# Patient Record
Sex: FEMALE | Race: AMERICAN INDIAN OR ALASKA NATIVE | NOT HISPANIC OR LATINO | URBAN - METROPOLITAN AREA
[De-identification: names, ages, dates, MRNs, and addresses within clinical notes are randomized per-mention and may not be internally consistent; named-entity substitution may affect disease eponyms.]

---

## 2017-02-16 ENCOUNTER — NEW PATIENT (OUTPATIENT)
Dept: URBAN - METROPOLITAN AREA CLINIC 30 | Facility: CLINIC | Age: 82
End: 2017-02-16
Payer: MEDICARE

## 2017-02-16 DIAGNOSIS — H02.055 TRICHIASIS WITHOUT ENTROPION, LEFT LOWER LID: ICD-10-CM

## 2017-02-16 PROCEDURE — 67820 REVISE EYELASHES: CPT | Performed by: OPTOMETRIST

## 2017-02-16 PROCEDURE — 99203 OFFICE O/P NEW LOW 30 MIN: CPT | Performed by: OPTOMETRIST

## 2017-02-16 RX ORDER — NEOMYCIN SULFATE, POLYMYXIN B SULFATE AND DEXAMETHASONE 3.5; 10000; 1 MG/ML; [USP'U]/ML; MG/ML
SUSPENSION OPHTHALMIC
Qty: 5 | Refills: 0 | Status: INACTIVE
Start: 2017-02-16 | End: 2018-09-17

## 2017-02-16 ASSESSMENT — INTRAOCULAR PRESSURE
OS: 16
OD: 17

## 2017-07-31 ENCOUNTER — FOLLOW UP ESTABLISHED (OUTPATIENT)
Dept: URBAN - METROPOLITAN AREA CLINIC 64 | Facility: CLINIC | Age: 82
End: 2017-07-31
Payer: MEDICARE

## 2017-07-31 PROCEDURE — 67820 REVISE EYELASHES: CPT | Performed by: OPTOMETRIST

## 2017-07-31 PROCEDURE — 92012 INTRM OPH EXAM EST PATIENT: CPT | Performed by: OPTOMETRIST

## 2018-09-17 ENCOUNTER — FOLLOW UP ESTABLISHED (OUTPATIENT)
Dept: URBAN - METROPOLITAN AREA CLINIC 30 | Facility: CLINIC | Age: 83
End: 2018-09-17
Payer: MEDICARE

## 2018-09-17 DIAGNOSIS — H35.363 DRUSEN (DEGENERATIVE) OF MACULA, BILATERAL: ICD-10-CM

## 2018-09-17 DIAGNOSIS — D31.32 BENIGN NEOPLASM OF LEFT CHOROID: ICD-10-CM

## 2018-09-17 PROCEDURE — 92250 FUNDUS PHOTOGRAPHY W/I&R: CPT | Performed by: OPTOMETRIST

## 2018-09-17 PROCEDURE — 92014 COMPRE OPH EXAM EST PT 1/>: CPT | Performed by: OPTOMETRIST

## 2018-09-17 ASSESSMENT — INTRAOCULAR PRESSURE
OS: 14
OD: 14

## 2018-09-17 ASSESSMENT — KERATOMETRY
OS: 43.87
OD: 43.89

## 2018-09-17 ASSESSMENT — VISUAL ACUITY
OD: 20/20
OS: 20/20

## 2021-04-20 ENCOUNTER — OFFICE VISIT (OUTPATIENT)
Dept: URBAN - METROPOLITAN AREA CLINIC 30 | Facility: CLINIC | Age: 86
End: 2021-04-20
Payer: COMMERCIAL

## 2021-04-20 PROCEDURE — 99203 OFFICE O/P NEW LOW 30 MIN: CPT | Performed by: OPHTHALMOLOGY

## 2021-04-20 PROCEDURE — 92285 EXTERNAL OCULAR PHOTOGRAPHY: CPT | Performed by: OPHTHALMOLOGY

## 2021-06-01 ENCOUNTER — PROCEDURE (OUTPATIENT)
Dept: URBAN - METROPOLITAN AREA CLINIC 26 | Facility: CLINIC | Age: 86
End: 2021-06-01
Payer: MEDICARE

## 2021-06-01 DIAGNOSIS — H16.223 KERATOCONJUNCTIVITIS SICCA, NOT SPECIFIED AS SJOGREN'S, BILATERAL: Primary | ICD-10-CM

## 2021-06-01 PROCEDURE — 68760 CLOSE TEAR DUCT OPENING: CPT | Performed by: OPHTHALMOLOGY

## 2021-08-04 ENCOUNTER — OFFICE VISIT (OUTPATIENT)
Dept: URBAN - METROPOLITAN AREA CLINIC 30 | Facility: CLINIC | Age: 86
End: 2021-08-04
Payer: MEDICARE

## 2021-08-04 PROCEDURE — 99213 OFFICE O/P EST LOW 20 MIN: CPT | Performed by: OPTOMETRIST

## 2021-08-04 ASSESSMENT — INTRAOCULAR PRESSURE
OS: 14
OD: 13

## 2021-08-04 NOTE — IMPRESSION/PLAN
Impression: Keratoconjunctivitis sicca, bilateral
s/p punctal cautery E1 and E3. Plan: Patient is using Restasis but not consistently. Rec using BID OU. Using Systane and Systane gel also at night. Feels the (upper) punctal cautery has been helpful. CPM and call if s/sx worsen. Has routine care elsewhere.

## 2024-07-10 NOTE — IMPRESSION/PLAN
Heart Failure Impression: Keratoconjunctivitis sicca, bilateral Plan: still symptomatic and with exam findings c/w ELAN, despite restasis and copious PFAT's. Recommend bilateral upper punctal cautery. Explained that if ELAN does not improve, we will consider lower punctal cautery in the future. Cont' Restasis and PFAT's; RTC for occlusion or sooner PRN.